# Patient Record
Sex: MALE | Race: WHITE | NOT HISPANIC OR LATINO | Employment: OTHER | URBAN - METROPOLITAN AREA
[De-identification: names, ages, dates, MRNs, and addresses within clinical notes are randomized per-mention and may not be internally consistent; named-entity substitution may affect disease eponyms.]

---

## 2018-09-24 ENCOUNTER — HOSPITAL ENCOUNTER (EMERGENCY)
Facility: MEDICAL CENTER | Age: 53
End: 2018-09-24
Attending: EMERGENCY MEDICINE
Payer: COMMERCIAL

## 2018-09-24 ENCOUNTER — APPOINTMENT (OUTPATIENT)
Dept: RADIOLOGY | Facility: MEDICAL CENTER | Age: 53
End: 2018-09-24
Attending: EMERGENCY MEDICINE
Payer: COMMERCIAL

## 2018-09-24 VITALS
HEIGHT: 69 IN | HEART RATE: 75 BPM | BODY MASS INDEX: 23.44 KG/M2 | RESPIRATION RATE: 18 BRPM | WEIGHT: 158.29 LBS | SYSTOLIC BLOOD PRESSURE: 139 MMHG | OXYGEN SATURATION: 96 % | TEMPERATURE: 97.1 F | DIASTOLIC BLOOD PRESSURE: 87 MMHG

## 2018-09-24 DIAGNOSIS — S62.101A CLOSED FRACTURE OF RIGHT WRIST, INITIAL ENCOUNTER: ICD-10-CM

## 2018-09-24 PROCEDURE — 302874 HCHG BANDAGE ACE 2 OR 3""

## 2018-09-24 PROCEDURE — 73110 X-RAY EXAM OF WRIST: CPT | Mod: RT

## 2018-09-24 PROCEDURE — 29125 APPL SHORT ARM SPLINT STATIC: CPT

## 2018-09-24 PROCEDURE — 99284 EMERGENCY DEPT VISIT MOD MDM: CPT

## 2018-09-24 PROCEDURE — 302875 HCHG BANDAGE ACE 4 OR 6""

## 2018-09-24 RX ORDER — M-VIT,TX,IRON,MINS/CALC/FOLIC 27MG-0.4MG
1 TABLET ORAL DAILY
COMMUNITY

## 2018-09-24 RX ORDER — FOLIC ACID 1 MG/1
1 TABLET ORAL DAILY
COMMUNITY

## 2018-09-24 RX ORDER — NAPROXEN SODIUM 220 MG
220 TABLET ORAL PRN
COMMUNITY

## 2018-09-24 ASSESSMENT — PAIN DESCRIPTION - DESCRIPTORS: DESCRIPTORS: TEARING

## 2018-09-24 NOTE — ED NOTES
Splint and sling applied per ERP orders. Discharge instructions provided.  Pt verbalized the understanding of discharge instructions to follow up with Ortho, PCP and to return to ER if condition worsens.  Pt ambulated out of ER without difficulty.

## 2018-09-24 NOTE — ED NOTES
Med rec updated and complete  Allergies reviewed.  Pt reports no antibiotics in the last 30 days.

## 2018-09-24 NOTE — ED PROVIDER NOTES
ED Provider Note    CHIEF COMPLAINT  Chief Complaint   Patient presents with   • Wrist Pain       HPI  Israel Toscano is a 53 y.o. male who presents for evaluation of right wrist pain. The patient reports having a ground-level fall on uneven surface around 2-3 days ago. He landed on outstretched right wrist. He specifically denies any injury to the head neck chest abdomen or pelvis no numbness weakness or tingling. He did not seek medical care and has been applying Ace bandages and ice to affected area. It is still painful swollen today and he presents for evaluation. He has not had any radiographs or seen any providers for this yet. No other symptoms reported    REVIEW OF SYSTEMS  See HPI for further details. No numbness tingling weakness fevers or chills All other systems are negative.     PAST MEDICAL HISTORY  Past Medical History:   Diagnosis Date   • Colitis    History of traumatic brain injury    FAMILY HISTORY  Noncontributory    SOCIAL HISTORY  Social History     Social History   • Marital status: Single     Spouse name: N/A   • Number of children: N/A   • Years of education: N/A     Social History Main Topics   • Smoking status: Never Smoker   • Smokeless tobacco: Never Used   • Alcohol use Not on file   • Drug use: Unknown   • Sexual activity: Not on file     Other Topics Concern   • Not on file     Social History Narrative   • No narrative on file     No drugs or alcohol  SURGICAL HISTORY  Past Surgical History:   Procedure Laterality Date   • OTHER NEUROLOGICAL SURG  5-07-82    traumatic brain injury       CURRENT MEDICATIONS  No current facility-administered medications for this encounter.     Current Outpatient Prescriptions:   •  folic acid (FOLVITE) 1 MG Tab, Take 1 mg by mouth every day., Disp: , Rfl:   •  naproxen (ALEVE) 220 MG tablet, Take 440 mg by mouth as needed., Disp: , Rfl:   •  therapeutic multivitamin-minerals (THERAGRAN-M) Tab, Take 1 Tab by mouth every day., Disp: , Rfl:   •   "mercaptopurine (PURINETHOL) 50 MG TABS, Take 50 mg by mouth 2 times daily, before breakfast and dinner., Disp: , Rfl:   •  sulfaSALAzine (AZULFIDINE) 500 MG TABS, Take 1,000-1,500 mg by mouth 3 times a day. Pt takes 1500mg at bf, 1000mg at lunch, and 1500mg at dinner , Disp: , Rfl:   •  lisinopril (PRINIVIL) 10 MG TABS, Take 10 mg by mouth every day., Disp: , Rfl:     ALLERGIES  Allergies   Allergen Reactions   • Other Environmental Hives     Cats, Pt reports hives all over body.   • Pcn [Penicillins] Hives     Pt reports hives all over body.         PHYSICAL EXAM  VITAL SIGNS: /80   Pulse 85   Temp 36.1 °C (97 °F)   Resp 20   Ht 1.753 m (5' 9\")   Wt 71.8 kg (158 lb 4.6 oz)   SpO2 95%   BMI 23.38 kg/m²  Room air O2: 95    Constitutional: Well developed, Well nourished, No acute distress, Non-toxic appearance.   HENT: Normocephalic, Atraumatic, Bilateral external ears normal, Oropharynx moist, No oral exudates, Nose normal.   Eyes: PERRLA, EOMI, Conjunctiva normal, No discharge.   Neck: Normal range of motion, No tenderness, Supple, No stridor.   Cardiovascular: Normal heart rate, Normal rhythm, No murmurs, No rubs, No gallops.   Thorax & Lungs: Normal breath sounds, No respiratory distress, No wheezing, No chest tenderness.   Abdomen: Bowel sounds normal, Soft, No tenderness, No masses, No pulsatile masses.   Skin: Warm, Dry, No erythema, No rash.   Back: No tenderness, No CVA tenderness.   Extremities: Bony tenderness noted on the dorsal aspect of the right wrist there is no gross deformity, compartments are soft no tenderness over the scaphoid radial pulses intact no bony tenderness over the metacarpal heads  Neurologic: Alert & oriented x 3, Normal motor function, Normal sensory function, No focal deficits noted.   Psychiatric: Anxious    RADIOLOGY/PROCEDURES  DX-WRIST-COMPLETE 3+ RIGHT   Final Result      Comminuted intra-articular mildly impacted distal radius fractures            COURSE & MEDICAL " DECISION MAKING  Pertinent Labs & Imaging studies reviewed. (See chart for details)  The patient has clinical evidence of a closed, marginally displaced intra-articular impacted distal radius fracture. Compartments are soft. He may ultimately require surgical intervention. We will place him in sugar tong splint and an arm sling. He'll be referred for urgent follow-up with [facility] with Dr. Garcia. He declined any narcotic prescription. Recommended he continue to take ibuprofen and Tylenol    FINAL IMPRESSION  1.   1. Closed fracture of right wrist, initial encounter Active              Electronically signed by: Rosalino De Los Santos, 9/24/2018 10:07 AM

## 2019-09-08 ENCOUNTER — APPOINTMENT (OUTPATIENT)
Dept: RADIOLOGY | Facility: MEDICAL CENTER | Age: 54
End: 2019-09-08
Attending: EMERGENCY MEDICINE
Payer: MEDICARE

## 2019-09-08 ENCOUNTER — HOSPITAL ENCOUNTER (EMERGENCY)
Facility: MEDICAL CENTER | Age: 54
End: 2019-09-08
Attending: EMERGENCY MEDICINE
Payer: MEDICARE

## 2019-09-08 VITALS
SYSTOLIC BLOOD PRESSURE: 123 MMHG | OXYGEN SATURATION: 95 % | TEMPERATURE: 97 F | DIASTOLIC BLOOD PRESSURE: 86 MMHG | BODY MASS INDEX: 24.49 KG/M2 | HEART RATE: 63 BPM | RESPIRATION RATE: 20 BRPM | HEIGHT: 69 IN | WEIGHT: 165.34 LBS

## 2019-09-08 DIAGNOSIS — M79.671 FOOT PAIN, RIGHT: ICD-10-CM

## 2019-09-08 PROCEDURE — 73610 X-RAY EXAM OF ANKLE: CPT | Mod: RT

## 2019-09-08 PROCEDURE — 99283 EMERGENCY DEPT VISIT LOW MDM: CPT

## 2019-09-08 PROCEDURE — 73630 X-RAY EXAM OF FOOT: CPT | Mod: RT

## 2019-09-08 RX ORDER — NAPROXEN 500 MG/1
500 TABLET ORAL 2 TIMES DAILY PRN
Qty: 20 TAB | Refills: 0 | Status: SHIPPED | OUTPATIENT
Start: 2019-09-08

## 2019-09-08 NOTE — ED NOTES
RN assist note -   All discharge instructions given to pt as well as Rx for naprosyn.  Pt advised not to drink or drive.  Pt verbalized understanding of all discharge instructions.  All questions answered.

## 2019-09-08 NOTE — ED PROVIDER NOTES
CHIEF COMPLAINT  Chief Complaint   Patient presents with   • Foot Pain      per pt,  he dropped something on his R foot on Thursday. pt able to stand of his feet.        HPI  Israel Toscano is a 54 y.o. male who presents with foot pain and ankle pain.  He states he dropped something on his right foot on Thursday but was unable to tell me what it was.  He states it was heavy.  He only has pain when he puts pressure on his right foot.  No numbness or weakness.  He placed an Ace wrap on his ankle for help with the pain.  Notes no other injury.    REVIEW OF SYSTEMS  No numbness or weakness, no breaks in the skin, no history of diabetes    PAST MEDICAL HISTORY  Past Medical History:   Diagnosis Date   • Colitis        FAMILY HISTORY  History reviewed. No pertinent family history.    SOCIAL HISTORY  Social History     Socioeconomic History   • Marital status: Single     Spouse name: Not on file   • Number of children: Not on file   • Years of education: Not on file   • Highest education level: Not on file   Occupational History   • Not on file   Social Needs   • Financial resource strain: Not on file   • Food insecurity:     Worry: Not on file     Inability: Not on file   • Transportation needs:     Medical: Not on file     Non-medical: Not on file   Tobacco Use   • Smoking status: Never Smoker   • Smokeless tobacco: Never Used   Substance and Sexual Activity   • Alcohol use: Yes     Alcohol/week: 0.6 oz     Types: 1 Cans of beer per week   • Drug use: Never   • Sexual activity: Not on file   Lifestyle   • Physical activity:     Days per week: Not on file     Minutes per session: Not on file   • Stress: Not on file   Relationships   • Social connections:     Talks on phone: Not on file     Gets together: Not on file     Attends Rastafarian service: Not on file     Active member of club or organization: Not on file     Attends meetings of clubs or organizations: Not on file     Relationship status: Not on file   •  "Intimate partner violence:     Fear of current or ex partner: Not on file     Emotionally abused: Not on file     Physically abused: Not on file     Forced sexual activity: Not on file   Other Topics Concern   • Not on file   Social History Narrative   • Not on file       SURGICAL HISTORY  Past Surgical History:   Procedure Laterality Date   • OTHER NEUROLOGICAL SURG  5-07-82    traumatic brain injury       CURRENT MEDICATIONS  Home Medications    **Home medications have not yet been reviewed for this encounter**         ALLERGIES  Allergies   Allergen Reactions   • Other Environmental Hives     Cats, Pt reports hives all over body.   • Pcn [Penicillins] Hives     Pt reports hives all over body.         PHYSICAL EXAM  VITAL SIGNS: /83   Pulse 69   Temp 36.1 °C (97 °F) (Temporal)   Resp 20   Ht 1.753 m (5' 9\")   Wt 75 kg (165 lb 5.5 oz)   SpO2 94%   BMI 24.42 kg/m²      Constitutional: Well developed, Well nourished, No acute distress, Non-toxic appearance.   HENT: Normocephalic, Atraumatic  Cardiovascular: Regular pulse  Lungs: No respiratory distress  Skin: Warm, Dry, no rash  Extremities: Mild soft tissue swelling at the medial malleolus on the right but there is no bony tenderness, there is no bony tenderness on the lateral malleolus.  Mild midfoot tenderness without soft tissue swelling, DP and PT pulses are 2+, dorsiflexion plantarflexion is 5 out of 5, sensation intact, no Achilles laxity or calcaneus tenderness  Neurologic: Alert, appropriate, follows commands  Psychiatric: Affect normal    RADIOLOGY/PROCEDURES  DX-FOOT-COMPLETE 3+ RIGHT   Final Result      No evidence of acute fracture or dislocation.      Degenerative changes at the first MTP joint.      DX-ANKLE 3+ VIEWS RIGHT   Final Result      No evidence of fracture or dislocation.        COURSE & MEDICAL DECISION MAKING  Pertinent Labs & Imaging studies reviewed. (See chart for details)  This is a 34-year-old who injured his ankle and " foot after something heavy fell on it several days ago.  He is able to ambulate without significant difficulty.  He has no evidence of neurovascular compromise.  X-rays are negative.  He will be treated supportively and referred back to his orthopedist.  He will be written for an anti-inflammatory.    FINAL IMPRESSION  1.  Foot injury  2.   3.         Electronically signed by: Francisco Jorge, 9/8/2019 8:12 AM

## 2019-09-08 NOTE — ED TRIAGE NOTES
"Chief Complaint   Patient presents with   • Foot Pain      per pt,  he dropped something on his R foot on Thursday. pt able to stand of his feet.      /83   Pulse 69   Temp 36.1 °C (97 °F) (Temporal)   Resp 20   Ht 1.753 m (5' 9\")   Wt 75 kg (165 lb 5.5 oz)   SpO2 94%   BMI 24.42 kg/m²     "

## 2019-09-08 NOTE — ED NOTES
Pt taken to the restroom via WC. Pt then returned to his room via WC. Pt has no further needs at this time